# Patient Record
Sex: FEMALE | Race: BLACK OR AFRICAN AMERICAN | Employment: OTHER | URBAN - METROPOLITAN AREA
[De-identification: names, ages, dates, MRNs, and addresses within clinical notes are randomized per-mention and may not be internally consistent; named-entity substitution may affect disease eponyms.]

---

## 2023-03-08 ENCOUNTER — HOSPITAL ENCOUNTER (EMERGENCY)
Age: 43
Discharge: HOME | End: 2023-03-08
Payer: MEDICAID

## 2023-03-08 ENCOUNTER — APPOINTMENT (OUTPATIENT)
Dept: GENERAL RADIOLOGY | Age: 43
End: 2023-03-08
Payer: MEDICAID

## 2023-03-08 VITALS
BODY MASS INDEX: 20.4 KG/M2 | TEMPERATURE: 98.6 F | DIASTOLIC BLOOD PRESSURE: 104 MMHG | SYSTOLIC BLOOD PRESSURE: 161 MMHG | RESPIRATION RATE: 18 BRPM | HEART RATE: 100 BPM | OXYGEN SATURATION: 100 % | HEIGHT: 67 IN | WEIGHT: 130 LBS

## 2023-03-08 DIAGNOSIS — S83.91XA SPRAIN OF RIGHT KNEE, UNSPECIFIED LIGAMENT, INITIAL ENCOUNTER: Primary | ICD-10-CM

## 2023-03-08 DIAGNOSIS — M25.461 KNEE EFFUSION, RIGHT: ICD-10-CM

## 2023-03-08 PROCEDURE — 99283 EMERGENCY DEPT VISIT LOW MDM: CPT

## 2023-03-08 PROCEDURE — 73562 X-RAY EXAM OF KNEE 3: CPT

## 2023-03-08 PROCEDURE — 6370000000 HC RX 637 (ALT 250 FOR IP): Performed by: NURSE PRACTITIONER

## 2023-03-08 RX ORDER — NAPROXEN 500 MG/1
500 TABLET ORAL 2 TIMES DAILY PRN
Qty: 14 TABLET | Refills: 0 | Status: SHIPPED | OUTPATIENT
Start: 2023-03-08 | End: 2023-03-15

## 2023-03-08 RX ORDER — ONDANSETRON 4 MG/1
4 TABLET, ORALLY DISINTEGRATING ORAL ONCE
Status: COMPLETED | OUTPATIENT
Start: 2023-03-08 | End: 2023-03-08

## 2023-03-08 RX ORDER — IBUPROFEN 800 MG/1
800 TABLET ORAL ONCE
Status: COMPLETED | OUTPATIENT
Start: 2023-03-08 | End: 2023-03-08

## 2023-03-08 RX ADMIN — IBUPROFEN 800 MG: 800 TABLET, FILM COATED ORAL at 21:26

## 2023-03-08 RX ADMIN — ONDANSETRON 4 MG: 4 TABLET, ORALLY DISINTEGRATING ORAL at 21:32

## 2023-03-09 NOTE — ED PROVIDER NOTES
Independent JACK Visit. [facility]  Department of Emergency Medicine   ED  Encounter Note  Admit Date/RoomTime: 3/8/2023  9:12 PM  ED Room: 32/    NAME: Hermila Rucker  : 1980  MRN: [id number]     Chief Complaint:  Knee Injury (Right, fell on it x1 week ago, reinjured it)    History of Present Illness       Hermila Rucker is a 36 y.o. old female who presents to the emergency department by private vehicle with her minor children, for traumatic pain located patella and suprapatella  to right knee  which occured 2:45 PM  prior to arrival after reinjuring her knee. Patient reports she was wearing stiletto boots a week ago and twisted her knee and fell onto the ground and had swelling and today she accidentally bumped the knee again against a wall reinjuring it and since onset the symptoms have been persistent and gradually worsening with swelling. Patient has no prior history of pain/injury with regards to today's visit. Her pain is aggraveated by weight bearing or bending the knee and relieved by straightening the leg. Her weight bearing ability is: abnormal. She denies any fever, chills, redness, warmth or wounds. Tetanus Status: up to date. She did not attempt to take any Tylenol Motrin prior to arrival.  She denied any head injury, loss of consciousness, dizziness, back pain, chest pain or any other symptoms at this time. ROS   Pertinent positives and negatives are stated within HPI, all other systems reviewed and are negative. Past Medical History:  has no past medical history on file. Surgical History:  has a past surgical history that includes hernia repair and  section (). Social History:  reports that she has quit smoking. She smoked an average of .5 packs per day. She does not have any smokeless tobacco history on file. She reports current alcohol use. She reports that she does not use drugs. Family History: family history is not on file. Allergies: Pcn [penicillins]    Physical Exam   Oxygen Saturation Interpretation: Normal.        ED Triage Vitals   BP Temp Temp src Heart Rate Resp SpO2 Height Weight   03/08/23 2034 03/08/23 2034 -- 03/08/23 2026 03/08/23 2026 03/08/23 2026 03/08/23 2034 03/08/23 2034   (!) 161/104 98.6 °F (37 °C)  (!) 110 16 98 % 5' 6.5\" (1.689 m) 130 lb (59 kg)         Constitutional:  Alert, development consistent with age. Neck:  Normal ROM. Supple. Physical Exam  Right Knee: suprapatellar, patellar            Tenderness:  moderate to the patellar region. Swelling/Effusion: Mild. Deformity: no deformity observed/palpated. ROM: diminished range with pain. Skin:  no wounds or erythema and swelling. Drawer's:  Unable to Assess patient would not let me perform any maneuvers. Lachman's: Unable to Assess. Apley's: Unable to Assess. Fabricio's: Unable to Assess. Valgus/Varus Stress: Unable to Assess. Crepitus: Unable to Assess. Hip:            Tenderness:  none. Swelling: None. Deformity: no.              ROM: full range of motion. Skin:  no wounds, erythema, or swelling. Joint(s) Above/Below: shin, calf, and ankle. Tenderness:  none. Swelling: No.              Deformity: no deformity observed/palpated. ROM: full range of motion. Skin:  no wounds, erythema, or swelling. Neurovascular: Motor deficit: none. Straight leg lift negative. Sensory deficit: none. Full sensation intact to light touch in distal toes. Pulse deficit: none. 2+ pedal and posterior tibial pulses intact. Capillary refill: normal.  Brisk less than 3 seconds in distal extremity. Gait:  limp due to affected limb. Lymphatics: No lymphangitis or adenopathy noted. Neurological:  Oriented. Motor functions intact.     Lab / Imaging Results   (All laboratory and radiology results have been personally reviewed by myself)  Labs:  No results found for this visit on 03/08/23. Imaging: All Radiology results interpreted by Radiologist unless otherwise noted. XR KNEE RIGHT (3 VIEWS)   Final Result   Joint effusion. No fractures. ED Course / Medical Decision Making     Medications   ibuprofen (ADVIL;MOTRIN) tablet 800 mg (800 mg Oral Given 3/8/23 2126)   ondansetron (ZOFRAN-ODT) disintegrating tablet 4 mg (4 mg Oral Given 3/8/23 2132)         Re evaluation: 1055 discussed results of x-ray with patient. Symptoms are improved as long as she is not weightbearing and Ace wrap applied and knee immobilizer applied. Instructed on rest ice elevation and will give crutches. Consult(s):   None    Procedure(s):     PROCEDURE NOTE  3/8/23       Time: 0222    SPLINT  APPLICATION  Risks, benefits and alternatives (for applicable procedures below) described. Performed By: Nursing staff applied applied knee immobilizer    Indication: Right knee sprain . Procedure:   A   KNEE IMMOBILIZER  splint was applied by nursing staff  The patient tolerated the procedure well. Pinky Angles History from : Patient    Limitations to history : None    Discussion with Other Professionals : None    Social Determinants Significantly Affecting Health : None    MDM:  Katja Merida is a 36 y.o. old female who presents to the emergency department by private vehicle with her minor children, for traumatic pain located patella and suprapatella to right knee  which occured 2:45 PM  prior to arrival after reinjuring her knee. Patient reports she was wearing stiletto boots a week ago and twisted her knee and fell onto the ground and had swelling and today she accidentally bumped the knee again against a wall reinjuring it and since onset the symptoms have been persistent and gradually worsening with swelling.   Patient has no prior history of pain/injury with regards to today's visit. Her pain is aggraveated by weight bearing or bending the knee and relieved by straightening the leg. Her weight bearing ability is: abnormal. She denies any fever, chills, redness, warmth or wounds. Tetanus Status: up to date. She did not attempt to take any Tylenol Motrin prior to arrival.  She denied any head injury, loss of consciousness, dizziness, back pain, chest pain or any other symptoms at this time. Differential diagnosis to include but not limited to knee sprain versus knee fracture versus knee effusion. Patient was medicated with Motrin and also given a Zofran because she states she got a little nauseated when I palpated at the knee from pain and denies any abdominal pain. Patient is afebrile, nontoxic in appearance, hemodynamically stable she has no signs of septic joint she is afebrile, nontoxic in appearance she has no erythema no fevers initially she arrived she was tachycardic but was having pain and was medicated and her heart rate did improve. She was placed in an Ace and knee immobilizer and given crutches. She does have a effusion on x-ray she may have a torn ligament is advised to follow-up with orthopedic on-call for reevaluation. Patient advised on signs and symptoms warranting immediate return to the ED for reevaluation anytime. Patient was medicated in the ED and will be discharged with short course of NSAIDs. She was advised on follow-up with her PCP as well she may need an MRI to determine if she has a torn meniscus if symptoms do not improve. She was advised on rest ice elevation. Patient verbalized adequate understanding of discharge instructions follow-up care and if she were to get any fevers chills or any other symptoms she should return immediately. Patient had elevated blood pressure but was asymptomatic and most likely due to the pain and was driving and did not want anything further for pain.        Plan of Care/Counseling:  Kayleen Rm, DONTRELL - CNP reviewed today's visit with the patient in addition to providing specific details for the plan of care and counseling regarding the diagnosis and prognosis. Questions are answered at this time and are agreeable with the plan. Assessment      1. Sprain of right knee, unspecified ligament, initial encounter    2. Knee effusion, right      Plan   Discharged home. Patient condition is good    New Medications     Discharge Medication List as of 3/8/2023 11:06 PM        START taking these medications    Details   naproxen (NAPROSYN) 500 MG tablet Take 1 tablet by mouth 2 times daily as needed for Pain (take with food and full glass of water.), Disp-14 tablet, R-0Normal           Electronically signed by DONTRLEL Zheng CNP   DD: 3/8/23  **This report was transcribed using voice recognition software. Every effort was made to ensure accuracy; however, inadvertent computerized transcription errors may be present.   END OF ED PROVIDER NOTE       DONTRELL Zheng CNP  03/10/23 1112